# Patient Record
Sex: FEMALE | Race: WHITE | NOT HISPANIC OR LATINO
[De-identification: names, ages, dates, MRNs, and addresses within clinical notes are randomized per-mention and may not be internally consistent; named-entity substitution may affect disease eponyms.]

---

## 2018-10-22 ENCOUNTER — APPOINTMENT (OUTPATIENT)
Dept: OPHTHALMOLOGY | Facility: CLINIC | Age: 58
End: 2018-10-22
Payer: MEDICAID

## 2018-10-22 PROCEDURE — 92015 DETERMINE REFRACTIVE STATE: CPT

## 2018-10-22 PROCEDURE — 92014 COMPRE OPH EXAM EST PT 1/>: CPT

## 2019-04-08 ENCOUNTER — APPOINTMENT (OUTPATIENT)
Dept: OPHTHALMOLOGY | Facility: CLINIC | Age: 59
End: 2019-04-08

## 2019-05-08 ENCOUNTER — APPOINTMENT (OUTPATIENT)
Dept: OPHTHALMOLOGY | Facility: CLINIC | Age: 59
End: 2019-05-08
Payer: MEDICAID

## 2019-05-08 ENCOUNTER — NON-APPOINTMENT (OUTPATIENT)
Age: 59
End: 2019-05-08

## 2019-05-08 PROCEDURE — 92014 COMPRE OPH EXAM EST PT 1/>: CPT

## 2019-05-08 PROCEDURE — 92310 CONTACT LENS FITTING OU: CPT

## 2019-06-05 ENCOUNTER — APPOINTMENT (OUTPATIENT)
Dept: OPHTHALMOLOGY | Facility: CLINIC | Age: 59
End: 2019-06-05

## 2019-07-22 ENCOUNTER — APPOINTMENT (OUTPATIENT)
Dept: OPHTHALMOLOGY | Facility: CLINIC | Age: 59
End: 2019-07-22
Payer: SELF-PAY

## 2019-07-22 ENCOUNTER — NON-APPOINTMENT (OUTPATIENT)
Age: 59
End: 2019-07-22

## 2019-07-22 PROCEDURE — 92310 CONTACT LENS FITTING OU: CPT | Mod: NC

## 2020-10-12 ENCOUNTER — NON-APPOINTMENT (OUTPATIENT)
Age: 60
End: 2020-10-12

## 2020-10-12 ENCOUNTER — APPOINTMENT (OUTPATIENT)
Dept: OPHTHALMOLOGY | Facility: CLINIC | Age: 60
End: 2020-10-12
Payer: COMMERCIAL

## 2020-10-12 PROCEDURE — 92015 DETERMINE REFRACTIVE STATE: CPT

## 2020-10-12 PROCEDURE — 92014 COMPRE OPH EXAM EST PT 1/>: CPT

## 2021-07-14 ENCOUNTER — APPOINTMENT (OUTPATIENT)
Dept: OPHTHALMOLOGY | Facility: CLINIC | Age: 61
End: 2021-07-14

## 2022-01-11 ENCOUNTER — RESULT REVIEW (OUTPATIENT)
Age: 62
End: 2022-01-11

## 2022-05-05 ENCOUNTER — APPOINTMENT (OUTPATIENT)
Dept: DERMATOLOGY | Facility: CLINIC | Age: 62
End: 2022-05-05
Payer: COMMERCIAL

## 2022-05-05 ENCOUNTER — NON-APPOINTMENT (OUTPATIENT)
Age: 62
End: 2022-05-05

## 2022-05-05 VITALS — HEIGHT: 64 IN | WEIGHT: 156 LBS | BODY MASS INDEX: 26.63 KG/M2

## 2022-05-05 DIAGNOSIS — L65.9 NONSCARRING HAIR LOSS, UNSPECIFIED: ICD-10-CM

## 2022-05-05 DIAGNOSIS — L81.9 DISORDER OF PIGMENTATION, UNSPECIFIED: ICD-10-CM

## 2022-05-05 PROCEDURE — 99203 OFFICE O/P NEW LOW 30 MIN: CPT

## 2023-01-26 ENCOUNTER — NON-APPOINTMENT (OUTPATIENT)
Age: 63
End: 2023-01-26

## 2023-01-26 ENCOUNTER — APPOINTMENT (OUTPATIENT)
Dept: OPHTHALMOLOGY | Facility: CLINIC | Age: 63
End: 2023-01-26
Payer: COMMERCIAL

## 2023-01-26 PROCEDURE — 92014 COMPRE OPH EXAM EST PT 1/>: CPT

## 2023-01-26 PROCEDURE — 92015 DETERMINE REFRACTIVE STATE: CPT

## 2023-06-14 ENCOUNTER — APPOINTMENT (OUTPATIENT)
Dept: PODIATRY | Facility: CLINIC | Age: 63
End: 2023-06-14
Payer: COMMERCIAL

## 2023-06-14 DIAGNOSIS — Z80.3 FAMILY HISTORY OF MALIGNANT NEOPLASM OF BREAST: ICD-10-CM

## 2023-06-14 DIAGNOSIS — M77.51 OTHER ENTHESOPATHY OF RT FOOT AND ANKLE: ICD-10-CM

## 2023-06-14 PROCEDURE — 73630 X-RAY EXAM OF FOOT: CPT | Mod: RT

## 2023-06-14 PROCEDURE — 20600 DRAIN/INJ JOINT/BURSA W/O US: CPT | Mod: RT

## 2023-06-14 PROCEDURE — 99213 OFFICE O/P EST LOW 20 MIN: CPT | Mod: 25

## 2023-06-19 NOTE — ASSESSMENT
[Verbal] : verbal [Patient] : patient [Good - alert, interested, motivated] : Good - alert, interested, motivated [Pressure relief] : pressure relief [Signs and symptoms of infection] : sign and symptoms of infection [Off-loading] : off-loading [FreeTextEntry1] : Impression: Dorsal bursitis, right (M77.51) with mild hallux limitus (M20.5x1).  \par \par Recommendations: Attempt at an injection into the area.  After discussing all risks, benefits and alternatives, patient consented.\par \par Treatment: Patient was injected with a combination of 1% Xylocaine, plain, 0.5% Marcaine, plain and Kenalog-40, a total of 1cc into the right 1st MPJ. \par Patient was given home care instructions.  Will reevaluate in 2 weeks, if pain persists.  Any questions or problems, patient is to contact the office. \par

## 2023-06-19 NOTE — PHYSICAL EXAM
[2+] : left foot dorsalis pedis 2+ [No Joint Swelling] : no joint swelling [Normal Foot/Ankle] : Both lower extremities were exposed and visualized. Standing exam demonstrates normal foot posture and alignment. Hindfoot exam shows no hindfoot valgus or varus [Skin Color & Pigmentation] : normal skin color and pigmentation [Skin Turgor] : normal skin turgor [Skin Lesions] : no skin lesions [Sensation] : the sensory exam was normal to light touch and pinprick [No Focal Deficits] : no focal deficits [Deep Tendon Reflexes (DTR)] : deep tendon reflexes were 2+ and symmetric [Motor Exam] : the motor exam was normal [General Appearance - Alert] : alert [Oriented To Time, Place, And Person] : oriented to person, place, and time [Ankle Swelling (On Exam)] : not present [Varicose Veins Of Lower Extremities] : not present [] : not present [Delayed in the Right Toes] : capillary refills normal in right toes [Delayed in the Left Toes] : capillary refills normal in the left toes [de-identified] : Some mild hallux limitus, right.  HAV with bunion.  Forefoot varus, fully compensated.   [Foot Ulcer] : no foot ulcer [Skin Induration] : no skin induration [FreeTextEntry1] : Bursitis over the 1st MPJ, right foot.  Bursitis overlying the dorsal aspect of the 1st metatarsal.

## 2023-06-19 NOTE — HISTORY OF PRESENT ILLNESS
[Sneakers] : earl [FreeTextEntry1] : Patient presents today with a painful right 1st MPJ.  She recently went to a wedding and it has been getting progressively worse since then.  She has pain over the dorsal right bunion.  Patient has difficulty ambulating due to pain and discomfort.

## 2023-06-19 NOTE — PROCEDURE
[MTP] : metatarsalphalangeal [Other:____] : ~M [unfilled] [Right Foot] : was performed on the right foot [Therapeutic] : therapeutic [Patient] : Prior to the start of the procedure a time out was taken and the identity of the patient was confirmed via name and date of birth with the patient. The correct site and the procedure to be performed were confirmed. The correct side was confirmed if applicable. The availability of the correct equipment was verified [1%] : 1%  [25 gauge] : A 25 gauge needle was used [Kenalog 40] : Kenalog 40 [Tolerated Well] : tolerated the procedure well [Instructions Given] : given handouts/patient instructions [No Complications] : There were no complications. [Patient Instructed to Call] : instructed to call if redness at site, a decrease in range of motion or an increase in pain is noted after procedure. [FreeTextEntry1] : X-rays were taken to rule out for fracture.\par (3 views - right foot) HAV with bunion.  Some mild dorsal spurring at the right 1st MPJ.  No evidence of any fracture/dislocation.

## 2023-12-26 ENCOUNTER — APPOINTMENT (OUTPATIENT)
Dept: OPHTHALMOLOGY | Facility: CLINIC | Age: 63
End: 2023-12-26

## 2023-12-26 ENCOUNTER — APPOINTMENT (OUTPATIENT)
Dept: PODIATRY | Facility: CLINIC | Age: 63
End: 2023-12-26
Payer: COMMERCIAL

## 2023-12-26 PROCEDURE — 20600 DRAIN/INJ JOINT/BURSA W/O US: CPT | Mod: RT

## 2023-12-26 PROCEDURE — 99213 OFFICE O/P EST LOW 20 MIN: CPT | Mod: 25

## 2023-12-26 NOTE — PROCEDURE
[MTP] : metatarsalphalangeal [Other:____] : ~M [unfilled] [Right Foot] : was performed on the right foot [Therapeutic] : therapeutic [Patient] : Prior to the start of the procedure a time out was taken and the identity of the patient was confirmed via name and date of birth with the patient. The correct site and the procedure to be performed were confirmed. The correct side was confirmed if applicable. The availability of the correct equipment was verified [1%] : 1%  [25 gauge] : A 25 gauge needle was used [Kenalog 40] : Kenalog 40 [Tolerated Well] : tolerated the procedure well [No Complications] : There were no complications. [Instructions Given] : given handouts/patient instructions [Patient Instructed to Call] : instructed to call if redness at site, a decrease in range of motion or an increase in pain is noted after procedure.

## 2024-01-02 NOTE — HISTORY OF PRESENT ILLNESS
[Sneakers] : earl [FreeTextEntry1] : Patient presents today with recurrent pain, 1st MPJ, right.  She had an injection back in June at the 1st MPJ where she has a hallux limitus and a bunion that had a significant reduction in pain with the injection until recently.  No new changes to her medical status.

## 2024-01-02 NOTE — PHYSICAL EXAM
[2+] : left foot dorsalis pedis 2+ [Normal Foot/Ankle] : Both lower extremities were exposed and visualized. Standing exam demonstrates normal foot posture and alignment. Hindfoot exam shows no hindfoot valgus or varus [Skin Color & Pigmentation] : normal skin color and pigmentation [Skin Turgor] : normal skin turgor [Skin Lesions] : no skin lesions [Sensation] : the sensory exam was normal to light touch and pinprick [No Focal Deficits] : no focal deficits [Deep Tendon Reflexes (DTR)] : deep tendon reflexes were 2+ and symmetric [Motor Exam] : the motor exam was normal [General Appearance - Alert] : alert [Oriented To Time, Place, And Person] : oriented to person, place, and time [Ankle Swelling (On Exam)] : not present [Varicose Veins Of Lower Extremities] : not present [] : not present [Delayed in the Right Toes] : capillary refills normal in right toes [Delayed in the Left Toes] : capillary refills normal in the left toes [Foot Ulcer] : no foot ulcer [Skin Induration] : no skin induration

## 2024-01-02 NOTE — ASSESSMENT
[Verbal] : verbal [Patient] : patient [Good - alert, interested, motivated] : Good - alert, interested, motivated [Signs and symptoms of infection] : sign and symptoms of infection [FreeTextEntry1] : Impression: Hallux limitus, right (M20.5x1).  Pain at the 1st MPJ, right.  Recommendations: Attempt at an injection into the 1st MPJ.  After discussing all risks, benefits and alternatives, patient consented.  Treatment: Patient was injected with a combination of 1% Xylocaine, plain, 0.5% Marcaine, plain and Kenalog-40, a total of 1cc into the right 1st MPJ.  Patient was given home care instructions.  Will reevaluate in 2 weeks, if pain persists. Any questions or problems, patient is to contact the office.

## 2024-02-15 ENCOUNTER — EMERGENCY (EMERGENCY)
Facility: HOSPITAL | Age: 64
LOS: 1 days | Discharge: HOME | End: 2024-02-15
Attending: EMERGENCY MEDICINE
Payer: SELF-PAY

## 2024-02-15 VITALS
RESPIRATION RATE: 18 BRPM | TEMPERATURE: 98 F | HEIGHT: 63 IN | WEIGHT: 158.07 LBS | DIASTOLIC BLOOD PRESSURE: 83 MMHG | SYSTOLIC BLOOD PRESSURE: 162 MMHG | HEART RATE: 69 BPM | OXYGEN SATURATION: 97 %

## 2024-02-15 PROCEDURE — 28450 TX TARSAL B1 FX W/O MNPJ EA: CPT | Mod: 54,LT

## 2024-02-15 PROCEDURE — 73562 X-RAY EXAM OF KNEE 3: CPT

## 2024-02-15 PROCEDURE — 99284 EMERGENCY DEPT VISIT MOD MDM: CPT | Mod: 25

## 2024-02-15 PROCEDURE — 73630 X-RAY EXAM OF FOOT: CPT

## 2024-02-15 PROCEDURE — 73610 X-RAY EXAM OF ANKLE: CPT

## 2024-02-15 PROCEDURE — 73562 X-RAY EXAM OF KNEE 3: CPT | Mod: 26,RT

## 2024-02-15 PROCEDURE — 99285 EMERGENCY DEPT VISIT HI MDM: CPT | Mod: 57

## 2024-02-15 PROCEDURE — 73610 X-RAY EXAM OF ANKLE: CPT | Mod: 26,LT

## 2024-02-15 PROCEDURE — 73630 X-RAY EXAM OF FOOT: CPT | Mod: 26,LT

## 2024-02-15 PROCEDURE — 29515 APPLICATION SHORT LEG SPLINT: CPT | Mod: LT

## 2024-02-15 RX ORDER — IBUPROFEN 200 MG
600 TABLET ORAL ONCE
Refills: 0 | Status: COMPLETED | OUTPATIENT
Start: 2024-02-15 | End: 2024-02-15

## 2024-02-15 RX ADMIN — Medication 600 MILLIGRAM(S): at 18:32

## 2024-02-15 NOTE — ED PROVIDER NOTE - CLINICAL SUMMARY MEDICAL DECISION MAKING FREE TEXT BOX
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 Pt slipped on ice and twisted left foot, falling onto R knee. No head trauma or other reported trauma. Pt reports can't ambulate on L foot. Plan: XRs, pain control, reassess

## 2024-02-15 NOTE — ED PROVIDER NOTE - PROGRESS NOTE DETAILS
X-ray noted with possible avulsion fracture of navicular bone.  Discussed with podiatry who reviewed x-ray, agree with NWB in posterior leg splint and outpatient follow-up with podiatrist Dr. Waterhouse within 1 week. - Duncan Simmons PA-C

## 2024-02-15 NOTE — ED PROVIDER NOTE - CARE PROVIDER_API CALL
Waterhouse, Joseph Cameron  Podiatric Medicine and Surgery  [address] [place] [address]  Phone: [phone]  Fax: [phone]  Follow Up Time: 4-6 Days

## 2024-02-15 NOTE — ED PROVIDER NOTE - PATIENT PORTAL LINK FT
You can access the FollowMyHealth Patient Portal offered by [facility] by registering at the following website: [email or web address]. By joining [facility]’s FollowMyHealth portal, you will also be able to view your health information using other applications (apps) compatible with our system.

## 2024-02-15 NOTE — ED ADULT NURSE REASSESSMENT NOTE - NS ED NURSE REASSESS COMMENT FT1
Pt provided occurrence report for the fall on the property security called to fill pout incident report [email or web address]

## 2024-02-15 NOTE — ED PROVIDER NOTE - NSFOLLOWUPINSTRUCTIONS_ED_ALL_ED_FT
Follow-up with podiatrist Dr. Waterhouse within 1 week.  Please call to schedule an appointment.    Please keep splint in place and intact.  Remain nonweightbearing on the left leg as directed.  Use crutches for assistance ambulating.    You may take Tylenol 650 mg every 6 hours as needed for pain.  You may take Motrin 600 mg every 8 hours as needed for additional pain relief.    Return the Emergency Department immediately if you develop any new/worsening symptoms including but not limited to increasing pain, numbness/tingling, weakness or any other concerning symptoms.

## 2024-02-15 NOTE — ED PROVIDER NOTE - NSTIMEPROVIDERCAREINITIATE_GEN_ER
15-Feb-2024 17:30 on gabapentin at home   will resume -c/w gabapentin  -start standing tylenol   -warm compresses  -pain consult on esau

## 2024-02-15 NOTE — ED PROVIDER NOTE - LOWER EXTREMITY EXAM, RIGHT
Superficial abrasion overlying minimal swelling to patella of right knee.  Negative Lachman's.  Full AROM at knee with mild pain at full flexion.  No other bony joint tenderness of RLE.  Full ROM all other joints of RLE 5/5 strength.  Sensation intact.

## 2024-02-15 NOTE — ED PROVIDER NOTE - LOWER EXTREMITY EXAM, LEFT
Minimal swelling noted to lateral malleolus of ankle with overlying tenderness to ATFL.  Additional tenderness to diffuse forefoot.  No tenderness base fifth metatarsal.  Limited ROM all directions at ankle secondary to pain.  No tenderness proximal tip/fib or bony knee of LLE.  Sensation intact. 2+ DP pulses bilaterally. Minimal swelling noted to lateral malleolus of ankle with overlying tenderness to ATFL.  Additional tenderness to diffuse forefoot.  No tenderness base fifth metatarsal.  Limited ROM all directions at ankle secondary to pain.  No tenderness proximal tib/fib or bony knee of LLE.  Sensation intact. 2+ DP pulses bilaterally.

## 2024-02-15 NOTE — ED ADULT NURSE NOTE - NSFALLRISKINTERV_ED_ALL_ED

## 2024-02-15 NOTE — ED PROVIDER NOTE - OBJECTIVE STATEMENT
63 yo female no reported PMhx presents ED complaining of left foot pain and right knee pain status post fall.  Patient is employee here and was running for the bus on the way to work when she slipped on a patch of ice and twisted left foot on ground causing her to fall and landing on right knee.  Patient complaining of pain to left forefoot and left outer aspect of ankle, worse with attempted weightbearing.  Additionally has mild pain to the front aspect of right knee.  Has never injured that foot before.  Denies head trauma, LOC, upper extremity pain, chest pain, shortness of breath, weakness, anticoagulant usage.

## 2024-02-20 ENCOUNTER — APPOINTMENT (OUTPATIENT)
Dept: PODIATRY | Facility: CLINIC | Age: 64
End: 2024-02-20
Payer: OTHER MISCELLANEOUS

## 2024-02-20 PROCEDURE — 28450 TX TARSAL B1 FX W/O MNPJ EA: CPT | Mod: LT

## 2024-02-20 PROCEDURE — 99203 OFFICE O/P NEW LOW 30 MIN: CPT | Mod: 57

## 2024-02-20 RX ORDER — MELOXICAM 15 MG/1
15 TABLET ORAL DAILY
Qty: 14 | Refills: 0 | Status: ACTIVE | COMMUNITY
Start: 2024-02-20 | End: 1900-01-01

## 2024-02-23 NOTE — PROCEDURE
[FreeTextEntry1] : Independently reviewed x-rays from [facility]. There is a small avulsion.  There is an os peroneum, which is also somewhat painful.  There may be a small avulsion fracture there as well.

## 2024-02-23 NOTE — PHYSICAL EXAM
[Ankle Swelling (On Exam)] : present [Ankle Swelling On The Left] : of the left ankle [Ankle Swelling On The Right] : mild [2+] : left foot dorsalis pedis 2+ [Skin Color & Pigmentation] : normal skin color and pigmentation [Skin Turgor] : normal skin turgor [Skin Lesions] : no skin lesions [Sensation] : the sensory exam was normal to light touch and pinprick [No Focal Deficits] : no focal deficits [Deep Tendon Reflexes (DTR)] : deep tendon reflexes were 2+ and symmetric [Motor Exam] : the motor exam was normal [General Appearance - Alert] : alert [Oriented To Time, Place, And Person] : oriented to person, place, and time [Varicose Veins Of Lower Extremities] : not present [] : not present [Delayed in the Left Toes] : capillary refills normal in the left toes [Delayed in the Right Toes] : capillary refills normal in right toes [Foot Ulcer] : no foot ulcer [de-identified] : Pain along the lateral aspect of the left ankle.  Pain over the cuboid and dorsal aspect of the navicular.   [Skin Induration] : no skin induration [FreeTextEntry1] : Some swelling noted to the left ankle.

## 2024-02-23 NOTE — HISTORY OF PRESENT ILLNESS
[Cast] : a cast [Other: ___] : [unfilled] [FreeTextEntry1] : Patient presents today with her  after sustaining an injury while at work, where she twisted her left ankle.  She has swelling and pain to the ankle.  She was seen at [facility] where she was x-rayed and diagnosed a dorsal avulsion fracture of the navicular.

## 2024-02-23 NOTE — ASSESSMENT
[FreeTextEntry1] : Impression: Fractured navicular, left, non-displaced (S92.255A).  Treatment: Patient was placed into a Cam boot, fitted by the orthotist.  Limit activities.   Any questions or problems, patient is to contact the office.

## 2024-03-05 ENCOUNTER — APPOINTMENT (OUTPATIENT)
Dept: PODIATRY | Facility: CLINIC | Age: 64
End: 2024-03-05
Payer: OTHER MISCELLANEOUS

## 2024-03-05 DIAGNOSIS — M21.611 BUNION OF RIGHT FOOT: ICD-10-CM

## 2024-03-05 DIAGNOSIS — M20.5X1 OTHER DEFORMITIES OF TOE(S) (ACQUIRED), RIGHT FOOT: ICD-10-CM

## 2024-03-05 DIAGNOSIS — E78.5 HYPERLIPIDEMIA, UNSPECIFIED: ICD-10-CM

## 2024-03-05 PROCEDURE — 99024 POSTOP FOLLOW-UP VISIT: CPT

## 2024-03-05 PROCEDURE — 73630 X-RAY EXAM OF FOOT: CPT | Mod: LT

## 2024-03-08 NOTE — HISTORY OF PRESENT ILLNESS
[CAM Boot] : a CAM boot [FreeTextEntry1] : Patient presents today with her  for reevaluation of avulsion fracture of the navicular, left foot that occurred while at work on 02/15/24.  Patient is doing well.  No new changes to her medical status.

## 2024-03-08 NOTE — PROCEDURE
[FreeTextEntry1] : X-rays were taken to assess healing. (3 views - left foot) The avulsion fracture is healing satisfactorily and in good alignment.

## 2024-03-08 NOTE — ASSESSMENT
[FreeTextEntry1] : Impression: Fracture of the left navicular (S92.255A). Hallux limitus, right (M20.5x1).  Pain at the 1st MPJ, right.  Work-related injury (Z02.6).  Bunion, right (M21.611).  Treatment: Patient has not reached her maximum medical improvement and she is still totally disabled.   Patient is to continue using the Cam boot and after one week, she is to attempt to transition out of it.  Will reevaluate in 2 weeks.   Any questions or problems, patient is to contact the office.

## 2024-03-08 NOTE — PHYSICAL EXAM
[Ankle Swelling (On Exam)] : present [Ankle Swelling On The Left] : of the left ankle [Ankle Swelling On The Right] : mild [2+] : left foot dorsalis pedis 2+ [Skin Color & Pigmentation] : normal skin color and pigmentation [Skin Turgor] : normal skin turgor [Skin Lesions] : no skin lesions [Sensation] : the sensory exam was normal to light touch and pinprick [No Focal Deficits] : no focal deficits [Deep Tendon Reflexes (DTR)] : deep tendon reflexes were 2+ and symmetric [Motor Exam] : the motor exam was normal [General Appearance - Alert] : alert [Oriented To Time, Place, And Person] : oriented to person, place, and time [Varicose Veins Of Lower Extremities] : not present [] : not present [Delayed in the Right Toes] : capillary refills normal in right toes [Delayed in the Left Toes] : capillary refills normal in the left toes [de-identified] : There is still some pain, dorsolaterally of the left foot.  Some mild pain at the right bunion as well due to antalgic gait.   [Foot Ulcer] : no foot ulcer [Skin Induration] : no skin induration

## 2024-03-20 ENCOUNTER — APPOINTMENT (OUTPATIENT)
Dept: PODIATRY | Facility: CLINIC | Age: 64
End: 2024-03-20
Payer: OTHER MISCELLANEOUS

## 2024-03-20 PROCEDURE — 99024 POSTOP FOLLOW-UP VISIT: CPT

## 2024-03-22 NOTE — HISTORY OF PRESENT ILLNESS
[FreeTextEntry1] : Patient presents today with her  for reevaluation of left foot pain.  She is getting some sinus tarsi pain now.  The pain over the navicular seems to have lessened but since she has been in a sneaker, she seems to be getting some pain within the sinus tarsi.  No new changes to her medical status.

## 2024-03-22 NOTE — ASSESSMENT
[FreeTextEntry1] : Impression: Small avulsion of the left navicular (S92.255A).  Tendonitis, posterior tibial tendon.  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  Work-related injury (Z02.6).    Treatment: Discussed the possibility of injection.  She does not want to do that at this time.  She was given a prescription for physical therapy.   Patient has not reached her maximum medical improvement and she is still totally disabled.   Will reevaluate in 2 weeks.   Any questions or problems, patient is to contact the office.

## 2024-03-22 NOTE — REVIEW OF SYSTEMS
[Fever] : no fever You can access the [facility] Patient Portal, offered by [facility], by registering with the following website: [email or web address] [Chills] : no chills

## 2024-03-22 NOTE — PHYSICAL EXAM
[Ankle Swelling (On Exam)] : present [Ankle Swelling On The Left] : of the left ankle [Ankle Swelling On The Right] : mild [2+] : left foot dorsalis pedis 2+ [Skin Color & Pigmentation] : normal skin color and pigmentation [Skin Turgor] : normal skin turgor [Skin Lesions] : no skin lesions [Sensation] : the sensory exam was normal to light touch and pinprick [No Focal Deficits] : no focal deficits [Motor Exam] : the motor exam was normal [Deep Tendon Reflexes (DTR)] : deep tendon reflexes were 2+ and symmetric [General Appearance - Alert] : alert [Oriented To Time, Place, And Person] : oriented to person, place, and time [Varicose Veins Of Lower Extremities] : not present [] : not present [Delayed in the Right Toes] : capillary refills normal in right toes [Delayed in the Left Toes] : capillary refills normal in the left toes [de-identified] : Pain within the sinus tarsi, left foot.  Decreased pain over the posterior tibial tendon.   [Foot Ulcer] : no foot ulcer [Skin Induration] : no skin induration

## 2024-04-01 ENCOUNTER — APPOINTMENT (OUTPATIENT)
Dept: PODIATRY | Facility: CLINIC | Age: 64
End: 2024-04-01
Payer: OTHER MISCELLANEOUS

## 2024-04-01 DIAGNOSIS — Z02.6 ENCOUNTER FOR EXAMINATION FOR INSURANCE PURPOSES: ICD-10-CM

## 2024-04-01 DIAGNOSIS — M79.89 OTHER SPECIFIED SOFT TISSUE DISORDERS: ICD-10-CM

## 2024-04-01 PROCEDURE — 99024 POSTOP FOLLOW-UP VISIT: CPT

## 2024-04-01 NOTE — PHYSICAL EXAM
[Ankle Swelling (On Exam)] : present [Ankle Swelling On The Left] : of the left ankle [Ankle Swelling On The Right] : mild [2+] : left foot dorsalis pedis 2+ [Skin Color & Pigmentation] : normal skin color and pigmentation [Skin Turgor] : normal skin turgor [] : no rash [Skin Lesions] : no skin lesions [Sensation] : the sensory exam was normal to light touch and pinprick [No Focal Deficits] : no focal deficits [Deep Tendon Reflexes (DTR)] : deep tendon reflexes were 2+ and symmetric [Motor Exam] : the motor exam was normal [General Appearance - Alert] : alert [Oriented To Time, Place, And Person] : oriented to person, place, and time

## 2024-04-12 NOTE — ED PROVIDER NOTE - ED STEMI HIDDEN
----- Message from Annalise Noland MA sent at 4/12/2024  2:15 PM EDT -----  Regarding: FW: Wegovy   Contact: [phone]  Please advise  ----- Message -----  From: Margret Yeager  Sent: 4/10/2024  10:20 PM EDT  To: [facility]  Subject: Wegovy                                           I had asked you to write me a lower dosage prescription for Wegovy thinking I’d start seeing how I’d do on a lower dose…1.7 mg.   However, I got a letter today that our insurance will no longer pay for this effective in July.   Sooo….since this would be my last 3 month script, I would like to see if you’d cancel the lower dosage and go back to the 2.4mg since this will be the last prescription per insurance.   So sorry to ask….  Please call if any questions.   Jennifer Oswald   hide

## 2024-04-15 ENCOUNTER — APPOINTMENT (OUTPATIENT)
Dept: PODIATRY | Facility: CLINIC | Age: 64
End: 2024-04-15
Payer: OTHER MISCELLANEOUS

## 2024-04-15 DIAGNOSIS — M25.572 PAIN IN LEFT ANKLE AND JOINTS OF LEFT FOOT: ICD-10-CM

## 2024-04-15 DIAGNOSIS — S92.255A NONDISPLACED FRACTURE OF NAVICULAR [SCAPHOID] OF LEFT FOOT, INITIAL ENCOUNTER FOR CLOSED FRACTURE: ICD-10-CM

## 2024-04-15 PROCEDURE — 73630 X-RAY EXAM OF FOOT: CPT | Mod: LT

## 2024-04-15 PROCEDURE — 99024 POSTOP FOLLOW-UP VISIT: CPT

## 2024-04-16 NOTE — PHYSICAL EXAM
[2+] : left foot dorsalis pedis 2+ [No Joint Swelling] : no joint swelling [Normal Foot/Ankle] : Both lower extremities were exposed and visualized. Standing exam demonstrates normal foot posture and alignment. Hindfoot exam shows no hindfoot valgus or varus [Skin Color & Pigmentation] : normal skin color and pigmentation [Skin Turgor] : normal skin turgor [Skin Lesions] : no skin lesions [Sensation] : the sensory exam was normal to light touch and pinprick [No Focal Deficits] : no focal deficits [Deep Tendon Reflexes (DTR)] : deep tendon reflexes were 2+ and symmetric [Motor Exam] : the motor exam was normal [General Appearance - Alert] : alert [Oriented To Time, Place, And Person] : oriented to person, place, and time [Ankle Swelling (On Exam)] : not present [Varicose Veins Of Lower Extremities] : not present [] : not present [Delayed in the Right Toes] : capillary refills normal in right toes [Delayed in the Left Toes] : capillary refills normal in the left toes [de-identified] : Pain is decreased.   [Foot Ulcer] : no foot ulcer [Skin Induration] : no skin induration [FreeTextEntry1] : Swelling is decreased.

## 2024-04-16 NOTE — ASSESSMENT
[FreeTextEntry1] : Impression: Small avulsion of the left navicular (S92.255A).  Foot swelling (M79.89).  Sinus tarsi syndrome, left ankle (M25.572).  Work-related injury (Z02.6).    Treatment: Patient can resume normal daily activities.  Patient has reached her Maximum Medical Improvement.  This injury was causally related to work.  At this time, the patient can return to full duties.   Any questions or problems, patient is to contact the office.

## 2024-04-16 NOTE — REASON FOR VISIT
[Follow-Up Visit] : a follow-up visit for [Ankle Pain] : ankle pain [Foot Pain] : foot pain [Other:___] : [unfilled]

## 2024-04-16 NOTE — HISTORY OF PRESENT ILLNESS
[FreeTextEntry1] : Patient presents today for reevaluation of sinus tarsi syndrome, left as well as an avulsion fracture of the navicular.  Patient is much improved in both areas.  No other changes to her medical status.

## 2024-04-16 NOTE — PROCEDURE
[FreeTextEntry1] : X-rays were taken to evaluate fracture healing. (3 views - left foot) The fracture is healed.

## 2025-01-08 ENCOUNTER — APPOINTMENT (OUTPATIENT)
Dept: PODIATRY | Facility: CLINIC | Age: 65
End: 2025-01-08
Payer: MEDICAID

## 2025-01-08 DIAGNOSIS — M20.5X1 OTHER DEFORMITIES OF TOE(S) (ACQUIRED), RIGHT FOOT: ICD-10-CM

## 2025-01-08 DIAGNOSIS — M21.611 BUNION OF RIGHT FOOT: ICD-10-CM

## 2025-01-08 DIAGNOSIS — M77.51 OTHER ENTHESOPATHY OF RT FOOT AND ANKLE: ICD-10-CM

## 2025-01-08 PROCEDURE — 73630 X-RAY EXAM OF FOOT: CPT | Mod: RT

## 2025-01-08 PROCEDURE — 99213 OFFICE O/P EST LOW 20 MIN: CPT | Mod: 25

## 2025-01-08 PROCEDURE — 20600 DRAIN/INJ JOINT/BURSA W/O US: CPT | Mod: RT

## 2025-01-29 ENCOUNTER — NON-APPOINTMENT (OUTPATIENT)
Age: 65
End: 2025-01-29

## 2025-04-05 ENCOUNTER — NON-APPOINTMENT (OUTPATIENT)
Age: 65
End: 2025-04-05

## 2025-04-07 ENCOUNTER — APPOINTMENT (OUTPATIENT)
Dept: PULMONOLOGY | Facility: CLINIC | Age: 65
End: 2025-04-07
Payer: MEDICAID

## 2025-04-07 VITALS
HEIGHT: 64 IN | DIASTOLIC BLOOD PRESSURE: 64 MMHG | HEART RATE: 72 BPM | RESPIRATION RATE: 18 BRPM | TEMPERATURE: 97.1 F | BODY MASS INDEX: 26.63 KG/M2 | WEIGHT: 156 LBS | OXYGEN SATURATION: 99 % | SYSTOLIC BLOOD PRESSURE: 118 MMHG

## 2025-04-07 DIAGNOSIS — E78.5 HYPERLIPIDEMIA, UNSPECIFIED: ICD-10-CM

## 2025-04-07 DIAGNOSIS — R91.1 SOLITARY PULMONARY NODULE: ICD-10-CM

## 2025-04-07 PROCEDURE — 99204 OFFICE O/P NEW MOD 45 MIN: CPT

## 2025-04-07 RX ORDER — ATORVASTATIN CALCIUM 80 MG/1
TABLET, FILM COATED ORAL
Refills: 0 | Status: ACTIVE | COMMUNITY

## 2025-04-16 ENCOUNTER — OUTPATIENT (OUTPATIENT)
Dept: OUTPATIENT SERVICES | Facility: HOSPITAL | Age: 65
LOS: 1 days | End: 2025-04-16
Payer: MEDICAID

## 2025-04-16 ENCOUNTER — APPOINTMENT (OUTPATIENT)
Dept: CT IMAGING | Facility: CLINIC | Age: 65
End: 2025-04-16

## 2025-04-16 DIAGNOSIS — R91.1 SOLITARY PULMONARY NODULE: ICD-10-CM

## 2025-04-16 PROCEDURE — 71250 CT THORAX DX C-: CPT

## 2025-04-16 PROCEDURE — 71250 CT THORAX DX C-: CPT | Mod: 26

## 2025-04-28 ENCOUNTER — NON-APPOINTMENT (OUTPATIENT)
Age: 65
End: 2025-04-28

## 2025-06-02 ENCOUNTER — APPOINTMENT (OUTPATIENT)
Dept: PULMONOLOGY | Facility: CLINIC | Age: 65
End: 2025-06-02
Payer: MEDICAID

## 2025-06-02 ENCOUNTER — NON-APPOINTMENT (OUTPATIENT)
Age: 65
End: 2025-06-02

## 2025-06-02 VITALS
WEIGHT: 158 LBS | OXYGEN SATURATION: 98 % | DIASTOLIC BLOOD PRESSURE: 80 MMHG | SYSTOLIC BLOOD PRESSURE: 120 MMHG | TEMPERATURE: 98 F | BODY MASS INDEX: 27.12 KG/M2 | HEART RATE: 85 BPM

## 2025-06-02 DIAGNOSIS — R13.10 DYSPHAGIA, UNSPECIFIED: ICD-10-CM

## 2025-06-02 DIAGNOSIS — R93.89 ABNORMAL FINDINGS ON DIAGNOSTIC IMAGING OF OTHER SPECIFIED BODY STRUCTURES: ICD-10-CM

## 2025-06-02 DIAGNOSIS — R91.1 SOLITARY PULMONARY NODULE: ICD-10-CM

## 2025-06-02 PROCEDURE — 99214 OFFICE O/P EST MOD 30 MIN: CPT

## 2025-06-06 ENCOUNTER — APPOINTMENT (OUTPATIENT)
Dept: OTOLARYNGOLOGY | Facility: CLINIC | Age: 65
End: 2025-06-06

## 2025-06-06 VITALS
DIASTOLIC BLOOD PRESSURE: 76 MMHG | WEIGHT: 160 LBS | BODY MASS INDEX: 27.31 KG/M2 | HEART RATE: 94 BPM | SYSTOLIC BLOOD PRESSURE: 129 MMHG | HEIGHT: 64 IN

## 2025-06-06 PROCEDURE — 92567 TYMPANOMETRY: CPT

## 2025-06-06 PROCEDURE — 92557 COMPREHENSIVE HEARING TEST: CPT

## 2025-06-06 PROCEDURE — 99204 OFFICE O/P NEW MOD 45 MIN: CPT

## 2025-06-06 RX ORDER — AMLODIPINE BESYLATE 5 MG/1
TABLET ORAL
Refills: 0 | Status: ACTIVE | COMMUNITY

## 2025-07-30 ENCOUNTER — APPOINTMENT (OUTPATIENT)
Dept: PODIATRY | Facility: CLINIC | Age: 65
End: 2025-07-30

## 2025-07-30 DIAGNOSIS — M20.5X1 OTHER DEFORMITIES OF TOE(S) (ACQUIRED), RIGHT FOOT: ICD-10-CM

## 2025-07-30 DIAGNOSIS — M21.611 BUNION OF RIGHT FOOT: ICD-10-CM

## 2025-07-30 PROCEDURE — 20600 DRAIN/INJ JOINT/BURSA W/O US: CPT | Mod: RT

## 2025-07-30 PROCEDURE — 99213 OFFICE O/P EST LOW 20 MIN: CPT | Mod: 25
